# Patient Record
Sex: FEMALE | Race: WHITE | Employment: FULL TIME | URBAN - METROPOLITAN AREA
[De-identification: names, ages, dates, MRNs, and addresses within clinical notes are randomized per-mention and may not be internally consistent; named-entity substitution may affect disease eponyms.]

---

## 2024-11-05 ENCOUNTER — TELEPHONE (OUTPATIENT)
Age: 29
End: 2024-11-05

## 2024-11-05 NOTE — TELEPHONE ENCOUNTER
HIPAA Verified (if caller is someone other than patient):           Reason for Call:     If calling to cancel, does patient want to reschedule?   yes    Is this call related to results?   no    If yes, please let patient know they must wait for their follow up / feedback appointment to discuss.  They can, however,  a copy of the results at the clinic 2-3 weeks after their testing appt.     Is this a New Patient Appointment Request?   no    If yes:   Requested Provider (choose all that apply - patient doesn't need to call ALL locations):   Vipul    Referred By:      Referral in chart?       Patient's Insurance Carrier (please ensure you gather insurance information):       Additional notes / reason for call:         **Please advise callers that it could take up to 5 business days for a return call**        Message: (any additional details from patient/caller not covered above)    Patient needs to reschedule testing procedure        Level 1 Calls - attempted to reach practice?         Reason Call Marked High Priority (if applicable):

## 2024-11-05 NOTE — TELEPHONE ENCOUNTER
Returned call and left message for patient to call back to discuss rescheduling the testing appointment.

## 2024-11-05 NOTE — TELEPHONE ENCOUNTER
Patient returned call. Please call [phone]    Pt states she is a  so sometimes she is unable to answer her phone so she asked that we please keep trying her if she misses our call.

## 2024-11-06 ENCOUNTER — OFFICE VISIT (OUTPATIENT)
Age: 29
End: 2024-11-06
Payer: COMMERCIAL

## 2024-11-06 DIAGNOSIS — F43.10 PTSD (POST-TRAUMATIC STRESS DISORDER): ICD-10-CM

## 2024-11-06 DIAGNOSIS — R45.851 PASSIVE SUICIDAL IDEATIONS: ICD-10-CM

## 2024-11-06 DIAGNOSIS — F41.9 ANXIETY DISORDER, UNSPECIFIED TYPE: ICD-10-CM

## 2024-11-06 DIAGNOSIS — Z65.8 INADEQUATE SOCIAL SUPPORT: ICD-10-CM

## 2024-11-06 DIAGNOSIS — F90.2 ATTENTION DEFICIT HYPERACTIVITY DISORDER (ADHD), COMBINED TYPE: Primary | ICD-10-CM

## 2024-11-06 DIAGNOSIS — F33.2 SEVERE EPISODE OF RECURRENT MAJOR DEPRESSIVE DISORDER, WITHOUT PSYCHOTIC FEATURES (HCC): ICD-10-CM

## 2024-11-06 DIAGNOSIS — Z65.8 PSYCHOSOCIAL DISTRESS: ICD-10-CM

## 2024-11-06 DIAGNOSIS — Z62.9 HISTORY OF ADVERSE CHILDHOOD EXPERIENCES: ICD-10-CM

## 2024-11-06 DIAGNOSIS — F41.0 PANIC ATTACKS: ICD-10-CM

## 2024-11-06 PROCEDURE — 96130 PSYCL TST EVAL PHYS/QHP 1ST: CPT | Performed by: PSYCHOLOGIST

## 2024-11-06 PROCEDURE — 96139 PSYCL/NRPSYC TST TECH EA: CPT | Performed by: PSYCHOLOGIST

## 2024-11-06 PROCEDURE — 96131 PSYCL TST EVAL PHYS/QHP EA: CPT | Performed by: PSYCHOLOGIST

## 2024-11-06 PROCEDURE — 96136 PSYCL/NRPSYC TST PHY/QHP 1ST: CPT | Performed by: PSYCHOLOGIST

## 2024-11-06 PROCEDURE — 96138 PSYCL/NRPSYC TECH 1ST: CPT | Performed by: PSYCHOLOGIST

## 2024-11-06 SDOH — HEALTH STABILITY - MENTAL HEALTH: PROBLEM RELATED TO UPBRINGING, UNSPECIFIED: Z62.9

## 2024-11-06 NOTE — PROGRESS NOTES
[facility]  [address], [facility], [address] [address]  Office:  [phone]  Fax: [phone]                  Psychological Evaluation Report    Patient Name: Mateo Calderón  Age: 29 y.o.  Gender: female   Handedness: right handed   Presenting Concern: attention and concentration deficits; anxiety and symptoms of depression  Primary Care Physician: None, None  Referring Provider: Fay Bassett MD    PATIENT HISTORY (OBTAINED DURING INITIAL CLINICAL EVALUATION):    REASON FOR REFERRAL:  This comprehensive and medically necessary neuropsychological assessment was requested to assist a differential diagnosis of cognitive and psychological concerns.  The use and purpose of this examination, as well as the extent and limitations of confidentiality, were explained prior to obtaining permission to participate.  Instructions were provided regarding the necessity to put forth optimal effort and answer questions truthfully in order to obtain reliable and accurate test results.    REVIEW OF RECORDS:  Ms. Calderón was referred by her PCP for a workup of attention and concentration deficits. She has started a college course and has reported difficulty learning new tasks. This is also affecting her in the vocational realm. Sensory processing difficulties are also noted. Zoloft is prescribed.     I did not see any neuroimaging in the available records.         Current Outpatient Medications   Medication Sig Dispense Refill    sertraline (ZOLOFT) 50 MG tablet Take 1 tablet by mouth daily 90 tablet 1     No current facility-administered medications for this visit.          No past medical history on file.  Failed to redirect to the Timeline version of the [employer] Smart[employer].    No data recorded          No data to display                 No past surgical history on file.    Social History     Socioeconomic History    Marital status: Single   Tobacco Use    Smoking status: Never     Passive exposure:

## 2024-11-22 ENCOUNTER — TELEMEDICINE (OUTPATIENT)
Age: 29
End: 2024-11-22
Payer: COMMERCIAL

## 2024-11-22 DIAGNOSIS — Z65.8 INADEQUATE SOCIAL SUPPORT: ICD-10-CM

## 2024-11-22 DIAGNOSIS — Z65.8 PSYCHOSOCIAL DISTRESS: ICD-10-CM

## 2024-11-22 DIAGNOSIS — F90.2 ATTENTION DEFICIT HYPERACTIVITY DISORDER (ADHD), COMBINED TYPE: Primary | ICD-10-CM

## 2024-11-22 DIAGNOSIS — F33.2 SEVERE EPISODE OF RECURRENT MAJOR DEPRESSIVE DISORDER, WITHOUT PSYCHOTIC FEATURES (HCC): ICD-10-CM

## 2024-11-22 DIAGNOSIS — Z62.9 HISTORY OF ADVERSE CHILDHOOD EXPERIENCES: ICD-10-CM

## 2024-11-22 DIAGNOSIS — F41.9 ANXIETY DISORDER, UNSPECIFIED TYPE: ICD-10-CM

## 2024-11-22 DIAGNOSIS — F43.10 PTSD (POST-TRAUMATIC STRESS DISORDER): ICD-10-CM

## 2024-11-22 DIAGNOSIS — F41.0 PANIC ATTACKS: ICD-10-CM

## 2024-11-22 DIAGNOSIS — R45.851 PASSIVE SUICIDAL IDEATIONS: ICD-10-CM

## 2024-11-22 PROCEDURE — 90832 PSYTX W PT 30 MINUTES: CPT | Performed by: PSYCHOLOGIST

## 2024-11-22 SDOH — HEALTH STABILITY - MENTAL HEALTH: PROBLEM RELATED TO UPBRINGING, UNSPECIFIED: Z62.9

## 2024-11-22 NOTE — PROGRESS NOTES
Interactive Psychotherapy/office feedback        Interactive office feedback session with NANCY Calderón.  I reviewed the results of the recent Neuropsychological Evaluation  including the observed areas of neurocognitive strengths and weaknesses. Education was provided regarding my diagnostic impressions, and treatment plan/options were discussed. I also answered numerous questions related to the clinical findings, including the various methods to improve cognition and mood. CBT, psychoeducation, and supportive psychotherapy techniques were utilized.     Patient's report placed in [facility] per patient request.     I encouraged Ms. Calderón to share a copy of her report with her therapist. She doesn't have an appointment scheduled. I encouraged her to do so and recommended that she be seen weekly due to the degree of depression and anxiety present.         Prior to seeing the patient I reviewed the records, including the previously completed report, the records in [facility], and any updated visits from other providers since I saw the patient last.       Diagnoses:      ADHD, combined type  R/O Autism Spectrum Disorder  Major Depressive Disorder, severe, without psychotic features versus Bipolar II Disorder  Passive Suicidal Ideation  PTSD  Anxiety Disorder, unspecified; Generalized Anxiety Disorder, provisional   Panic Attacks  Psychosocial Stress  Inadequate Social Support  History of Adverse Childhood Experiences    The patient will follow up with the referring provider, and reported being very pleased with the services provided.  Follow up with [facility] prn.      [address] psychotherapy 30 Minutes      This note was created using voice recognition software. Despite editing, there may be syntax errors.         Shakilamable Stephane, was evaluated through a synchronous (real-time) audio-video encounter. The patient (or guardian if applicable) is aware that this is a billable service, which includes applicable co-pays.

## 2024-12-18 ENCOUNTER — OFFICE VISIT (OUTPATIENT)
Age: 29
End: 2024-12-18
Payer: COMMERCIAL

## 2024-12-18 VITALS
HEIGHT: 69 IN | SYSTOLIC BLOOD PRESSURE: 111 MMHG | OXYGEN SATURATION: 98 % | DIASTOLIC BLOOD PRESSURE: 70 MMHG | WEIGHT: 253.6 LBS | HEART RATE: 68 BPM | RESPIRATION RATE: 16 BRPM | BODY MASS INDEX: 37.56 KG/M2 | TEMPERATURE: 98.2 F

## 2024-12-18 DIAGNOSIS — F90.2 ATTENTION DEFICIT HYPERACTIVITY DISORDER (ADHD), COMBINED TYPE: Primary | ICD-10-CM

## 2024-12-18 PROCEDURE — 99214 OFFICE O/P EST MOD 30 MIN: CPT | Performed by: FAMILY MEDICINE

## 2024-12-18 RX ORDER — LISDEXAMFETAMINE DIMESYLATE 20 MG/1
20 CAPSULE ORAL DAILY
Qty: 30 CAPSULE | Refills: 0 | Status: SHIPPED | OUTPATIENT
Start: 2025-01-17 | End: 2025-02-16

## 2024-12-18 RX ORDER — LISDEXAMFETAMINE DIMESYLATE 20 MG/1
20 CAPSULE ORAL DAILY
Qty: 30 CAPSULE | Refills: 0 | Status: SHIPPED | OUTPATIENT
Start: 2025-02-16 | End: 2025-03-18

## 2024-12-18 RX ORDER — LISDEXAMFETAMINE DIMESYLATE 20 MG/1
20 CAPSULE ORAL DAILY
Qty: 30 CAPSULE | Refills: 0 | Status: SHIPPED | OUTPATIENT
Start: 2024-12-18 | End: 2025-01-17

## 2024-12-18 NOTE — PROGRESS NOTES
Chief Complaint   Patient presents with    ADHD     Patient states she is here for ADHD testing, also stated she has previous testing results in her mychart.     Pt has testing with Dr. Costa, was diagnosed with ADHD.     Pt is very motivated to start medication, pt has been struggling at her job with completing tasks, maintaining concentration, poor focus. Occ difficulty word finding.     Subjective: (As above and below)     Chief Complaint   Patient presents with    ADHD     Patient states she is here for ADHD testing, also stated she has previous testing results in her mychart.     she is a 29 y.o. year old female who presents for evaluation.    Reviewed PmHx, RxHx, FmHx, SocHx, AllgHx and updated in chart.    Review of Systems - negative except as listed above    Objective:     Vitals:    12/18/24 0851   BP: 111/70   Site: Left Upper Arm   Pulse: 68   Resp: 16   Temp: 98.2 °F (36.8 °C)   SpO2: 98%   Weight: 115 kg (253 lb 9.6 oz)   Height: 1.753 m (5' 9\")     Physical Examination: General appearance - alert, well appearing, and in no distress  Mental status - normal mood, behavior, speech, dress, motor activity, and thought processes  Chest - clear to auscultation, no wheezes, rales or rhonchi, symmetric air entry  Heart - normal rate, regular rhythm, normal S1, S2, no murmurs, rubs, clicks or gallops  Musculoskeletal - no joint tenderness, deformity or swelling  Extremities - peripheral pulses normal, no pedal edema, no clubbing or cyanosis    Assessment/ Plan:   1. Attention deficit hyperactivity disorder (ADHD), combined type  -start on medication, new start, follow up in 3 months  -UDS today, contract  -concerned that pt has high expectation for symptoms resolution outside of just ADHD symptoms  - Compliance Drug Analysis, Urine; Future  - Lisdexamfetamine Dimesylate (VYVANSE) 20 MG CAPS; Take 1 capsule by mouth daily for 30 days. Max Daily Amount: 20 mg  Dispense: 30 capsule; Refill: 0  -

## 2024-12-18 NOTE — PROGRESS NOTES
Mateo Calderón is a 29 y.o. female    Chief Complaint   Patient presents with    ADHD     Patient states she is here for ADHD testing, also stated she has previous testing results in her mychart.     Vitals:    12/18/24 0851   BP: 111/70   Site: Left Upper Arm   Pulse: 68   Resp: 16   Temp: 98.2 °F (36.8 °C)   SpO2: 98%   Weight: 115 kg (253 lb 9.6 oz)   Height: 1.753 m (5' 9\")         Health Maintenance Due   Topic Date Due    Varicella vaccine (1 of 2 - 13+ 2-dose series) Never done    HIV screen  Never done    Hepatitis B vaccine (1 of 3 - 19+ 3-dose series) Never done    DTaP/Tdap/Td vaccine (1 - Tdap) Never done    Pap smear  Never done    Flu vaccine (1) Never done    COVID-19 Vaccine (1 - 2023-24 season) Never done         \"Have you been to the ER, urgent care clinic since your last visit?  Hospitalized since your last visit?\"    NO    “Have you seen or consulted any other health care providers outside of [facility] since your last visit?”    NO        “Have you had a pap smear?”    YES. 2024/FEB    No cervical cancer screening on file

## 2024-12-23 LAB — DRUGS UR: NORMAL

## 2025-01-08 ENCOUNTER — CLINICAL DOCUMENTATION (OUTPATIENT)
Age: 30
End: 2025-01-08